# Patient Record
(demographics unavailable — no encounter records)

---

## 2024-11-19 NOTE — ASSESSMENT
[FreeTextEntry1] : Hypothyroidism. She has been biochemically euthyroid. We have reviewed proper use and compliance with levothyroxine. Her levothyroxine requirements had previously fluctuated, perhaps due to fluctuating estrogen levels in the perimenopausal transition. Continue Synthroid 100 mcg, 1 pill 6 days/week and 1.5 pills 1 day/week  Weight gain. I congratulated her on her overall weight loss. She has been biochemically euthyroid. Cortisol appropriately suppressed after dexamethasone. We have reviewed lifestyle modifications for weight loss. She has declined consideration of pharmacologic options for weight loss.  Return to see me as needed; she will establish care with a new provider in [place].   CC: Dr. Thi Garber, Fax [phone]

## 2024-11-19 NOTE — HISTORY OF PRESENT ILLNESS
[FreeTextEntry1] : Ms. Sahree Morales is a 61 year-old woman with a history of hypothyroidism presenting for follow-up. I saw her for an initial visit in October 2020 and last in May 2024; she is a former patient of Dr. Vidhi Saxena.   Hypothyroidism.  She was diagnosed with hypothyroidism at age 28. She had been on Synthroid 100 or 112 mcg daily for many years. She did not tolerate generic levothyroxine in the past.  She is currently taking Synthroid 100 mcg, 1 pill 6 days/week and 1.5 pills 1 day/week (average daily dose 107 mcg).  She is taking levothyroxine in the morning, on an empty stomach, with plain water, and waiting at least 30 minutes before eating. She is not taking calcium/iron/multivitamin.  No history of radiation exposure. Paternal cousins with history of hypothyroidism.  Interim History  Recent TSH within the goal range as below.  She has seen Dr. Melanie Estes; note reviewed. She notes improvement in sleep disturbance with acupuncture. She has been walking more following improvement in plantar fasciitis but notes she could be more physically active.  Her mother is receiving immunotherapy and therapy for lymphedema.  Her employer passed away and she will no longer be travelling to the city.  She feels well today. Weight is up 3 pounds since last visit; weight is overall down 4 pounds from 162 pounds.  Medical and surgical history, medications, allergies, social and family history reviewed and updated as needed.  I have personally seen and examined the patient. I have collaborated with and supervised the

## 2024-11-19 NOTE — PHYSICAL EXAM
[Alert] : alert [Healthy Appearance] : healthy appearance [No Acute Distress] : no acute distress [Normal Sclera/Conjunctiva] : normal sclera/conjunctiva [Normal Hearing] : hearing was normal [No Respiratory Distress] : no respiratory distress [No Stigmata of Cushings Syndrome] : no stigmata of Cushings Syndrome [Normal Gait] : normal gait [Normal Insight/Judgement] : insight and judgment were intact [Kyphosis] : no kyphosis present [Acanthosis Nigricans] : no acanthosis nigricans [de-identified] : no moon facies, no supraclavicular fat pads

## 2025-03-26 NOTE — HISTORY OF PRESENT ILLNESS
[Home] : at home, [unfilled] , at the time of the visit. [Other Location: e.g. Home (Enter Location, City,State)___] : at [unfilled] [Telehealth (audio & video)] : This visit was provided via telehealth using real-time 2-way audio visual technology. [Verbal consent obtained from patient] : the patient, [unfilled] [FreeTextEntry1] : follow up [de-identified] : 61 yo F PMHx antiphospholipid syndrome, insomnia, hypothyroidism, hyperglycemia, b/l breast lumpectomy presents for follow up Patient states she had MR breast performed and told there was abnormality of her liver and gallbladder which she was not told of previously. MR breast b/l performed due to hx of atypical ductal hyperplasia in 2020 -- there was a "stable subcentimeter T2 hyperintense hepatic mass noted which may represent a cyst or hemangioma. Cholelithiasis present." She denies nausea, vomiting, abdominal pain, weight loss, diarrhea, excessive tylenol or etoh use.  She states she is no longer on alprazolam.  Hx of APS, has been seen by heme Dr. Álvaro Davis who advised no need for long term full anticoagulation unless a thrombotic event occurs. Gyn Dr Charles Endocrine Dr Cole Breast surgeon Dr Shaun Ramires Rheum Dr. Cota

## 2025-03-26 NOTE — HISTORY OF PRESENT ILLNESS
[Home] : at home, [unfilled] , at the time of the visit. [Other Location: e.g. Home (Enter Location, City,State)___] : at [unfilled] [Telehealth (audio & video)] : This visit was provided via telehealth using real-time 2-way audio visual technology. [Verbal consent obtained from patient] : the patient, [unfilled] [FreeTextEntry1] : follow up [de-identified] : 63 yo F PMHx antiphospholipid syndrome, insomnia, hypothyroidism, hyperglycemia, b/l breast lumpectomy presents for follow up Patient states she had MR breast performed and told there was abnormality of her liver and gallbladder which she was not told of previously. MR breast b/l performed due to hx of atypical ductal hyperplasia in 2020 -- there was a "stable subcentimeter T2 hyperintense hepatic mass noted which may represent a cyst or hemangioma. Cholelithiasis present." She denies nausea, vomiting, abdominal pain, weight loss, diarrhea, excessive tylenol or etoh use.  She states she is no longer on alprazolam.  Hx of APS, has been seen by heme Dr. Álvaro Davis who advised no need for long term full anticoagulation unless a thrombotic event occurs. Gyn Dr Charles Endocrine Dr Cole Breast surgeon Dr Shaun Ramires Rheum Dr. Cota

## 2025-03-26 NOTE — PLAN
[FreeTextEntry1] : Liver lesion MR breast b/l performed due to hx of atypical ductal hyperplasia in 2020 -- there was a "stable subcentimeter T2 hyperintense hepatic mass noted which may represent a cyst or hemangioma. Cholelithiasis present." - MR abdomen ordered to further evaluate - gastro follow up next week, Dr Solo   Hypothyroidism - continue synthroid 100 mcg, 1 pill 6 days/week and 1.5 pills 1 day/week